# Patient Record
Sex: MALE | Race: WHITE | NOT HISPANIC OR LATINO | Employment: FULL TIME | URBAN - METROPOLITAN AREA
[De-identification: names, ages, dates, MRNs, and addresses within clinical notes are randomized per-mention and may not be internally consistent; named-entity substitution may affect disease eponyms.]

---

## 2024-03-25 ENCOUNTER — APPOINTMENT (OUTPATIENT)
Dept: RADIOLOGY | Facility: CLINIC | Age: 49
End: 2024-03-25
Payer: COMMERCIAL

## 2024-03-25 VITALS
HEART RATE: 80 BPM | WEIGHT: 315 LBS | BODY MASS INDEX: 45.1 KG/M2 | HEIGHT: 70 IN | DIASTOLIC BLOOD PRESSURE: 86 MMHG | SYSTOLIC BLOOD PRESSURE: 126 MMHG

## 2024-03-25 DIAGNOSIS — M25.522 PAIN IN LEFT ELBOW: ICD-10-CM

## 2024-03-25 DIAGNOSIS — M70.22 OLECRANON BURSITIS, LEFT ELBOW: Primary | ICD-10-CM

## 2024-03-25 PROCEDURE — 73080 X-RAY EXAM OF ELBOW: CPT

## 2024-03-25 PROCEDURE — 99203 OFFICE O/P NEW LOW 30 MIN: CPT | Performed by: ORTHOPAEDIC SURGERY

## 2024-03-25 RX ORDER — MELOXICAM 7.5 MG/1
TABLET ORAL
COMMUNITY
Start: 2024-01-17

## 2024-03-25 RX ORDER — IRBESARTAN AND HYDROCHLOROTHIAZIDE 150; 12.5 MG/1; MG/1
TABLET, FILM COATED ORAL
COMMUNITY
Start: 2024-03-16

## 2024-03-25 RX ORDER — PANTOPRAZOLE SODIUM 40 MG/1
TABLET, DELAYED RELEASE ORAL
COMMUNITY
Start: 2024-03-08

## 2024-03-25 NOTE — PROGRESS NOTES
Assessment/Plan:  1. Olecranon bursitis, left elbow  XR elbow 3+ vw left          Nayan has left-sided elbow pain consistent with olecranon bursitis.  I recommended ice and compression.  His x-rays show no evidence of acute abnormality.  This should resolve in the next few weeks with conservative measures.  If pain persists or worsens we could consider aspiration or other treatment options.    Subjective:   Nayan Lazaro is a 48 y.o. male who presents to the office for evaluation for left-sided elbow pain.  He denies any recent traumatic injury.  He has noticed swelling over the left elbow for the last 2 to 3 weeks.  He has painless swelling that he states can be warm slightly at times.  He denies any fevers or chills.  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      Review of Systems   Constitutional:  Negative for chills, fever and unexpected weight change.   HENT:  Negative for hearing loss, nosebleeds and sore throat.    Eyes:  Negative for pain, redness and visual disturbance.   Respiratory:  Negative for cough, shortness of breath and wheezing.    Cardiovascular:  Negative for chest pain, palpitations and leg swelling.   Gastrointestinal:  Negative for abdominal pain, nausea and vomiting.   Endocrine: Negative for polyphagia and polyuria.   Genitourinary:  Negative for dysuria and hematuria.   Musculoskeletal:         See HPI   Skin:  Negative for rash and wound.   Neurological:  Negative for dizziness, numbness and headaches.   Psychiatric/Behavioral:  Negative for decreased concentration and suicidal ideas. The patient is not nervous/anxious.          Past Medical History:   Diagnosis Date    Hypertension        Past Surgical History:   Procedure Laterality Date    WISDOM TOOTH EXTRACTION         Family History   Family history unknown: Yes       Social History     Occupational History    Not on file   Tobacco Use    Smoking status: Never    Smokeless tobacco: Never   Vaping  Use    Vaping status: Never Used   Substance and Sexual Activity    Alcohol use: Yes    Drug use: Never    Sexual activity: Not on file         Current Outpatient Medications:     irbesartan-hydrochlorothiazide (AVALIDE) 150-12.5 MG per tablet, , Disp: , Rfl:     meloxicam (MOBIC) 7.5 mg tablet, , Disp: , Rfl:     pantoprazole (PROTONIX) 40 mg tablet, , Disp: , Rfl:     Allergies   Allergen Reactions    Lisinopril Hives and Shortness Of Breath       Objective:  Vitals:    03/25/24 1502   BP: 126/86   Pulse: 80     Pain Score: 0-No pain      Left Elbow Exam     Tenderness   Left elbow tenderness location: Olecranon bursa swollen, no tenderness.     Range of Motion   Extension:  normal   Flexion:  normal   Pronation:  normal   Supination:  normal     Muscle Strength   Pronation:  5/5   Supination:  5/5     Tests   Varus: negative  Valgus: negative    Other   Erythema: absent  Sensation: normal  Pulse: present    Comments:  No tenderness palpation over olecranon at the triceps insertion            Physical Exam  Vitals reviewed.   Constitutional:       Appearance: He is well-developed.   HENT:      Head: Normocephalic and atraumatic.   Eyes:      Conjunctiva/sclera: Conjunctivae normal.      Pupils: Pupils are equal, round, and reactive to light.   Cardiovascular:      Rate and Rhythm: Normal rate.      Pulses: Normal pulses.   Pulmonary:      Effort: Pulmonary effort is normal. No respiratory distress.   Musculoskeletal:      Cervical back: Normal range of motion and neck supple.      Comments: As noted in HPI   Skin:     General: Skin is warm and dry.   Neurological:      General: No focal deficit present.      Mental Status: He is alert and oriented to person, place, and time.   Psychiatric:         Mood and Affect: Mood normal.         Behavior: Behavior normal.         I have personally reviewed pertinent films in PACS and my interpretation is as follows:  X-rays of the left elbow demonstrate soft tissue swelling  over the olecranon coul.  Chronic appearing and thesis at the insertion of the triceps along the  olecranon process.  No obvious fracture.  This area is nontender on exam.      This document was created using speech voice recognition software.   Grammatical errors, random word insertions, pronoun errors, and incomplete sentences are an occasional consequence of this system due to software limitations, ambient noise, and hardware issues.   Any formal questions or concerns about content, text, or information contained within the body of this dictation should be directly addressed to the provider for clarification.

## 2024-04-26 VITALS — WEIGHT: 315 LBS | HEIGHT: 70 IN | BODY MASS INDEX: 45.1 KG/M2

## 2024-04-26 DIAGNOSIS — M70.22 OLECRANON BURSITIS, LEFT ELBOW: Primary | ICD-10-CM

## 2024-04-26 PROCEDURE — 99213 OFFICE O/P EST LOW 20 MIN: CPT | Performed by: ORTHOPAEDIC SURGERY

## 2024-04-26 RX ORDER — VALACYCLOVIR HYDROCHLORIDE 500 MG/1
TABLET, FILM COATED ORAL
COMMUNITY
Start: 2024-04-18

## 2024-04-26 NOTE — PROGRESS NOTES
Assessment/Plan:  1. Olecranon bursitis, left elbow  Ambulatory referral to Orthopedic Surgery          Nayan has ongoing chronic olecranon bursitis that has bothered him for several months.  I informed him that it is generally recommended to avoid any needle in an olecranon bursa to help reduce the risk of infection.  At this point it has been present for several months and I do think aspiration is reasonable but I would like a elbow or hand surgeon to be the one to aspirate him in case there is any complication or need to do this in any other manner.  He was agreeable to this.  We have sent him to our hand surgeon for further discussion and treatment options for this.      Subjective:   Nayan Lazaro is a 48 y.o. male who presents to the office for follow-up for left elbow discomfort.  He has chronic olecranon bursitis of the left elbow.  He was last seen in the office 1 month ago and was recommended to undergo conservative treatment of ice, compression and anti-inflammatories.  He states conservative measures have not seemingly helped and he continues to have the swelling that is bothersome.  He is not very painful.  Denies any signs of infection such as warmth, erythema or any fevers or chills.  He denies any new injury.      Review of Systems   Constitutional:  Negative for chills, fever and unexpected weight change.   HENT:  Negative for hearing loss, nosebleeds and sore throat.    Eyes:  Negative for pain, redness and visual disturbance.   Respiratory:  Negative for cough, shortness of breath and wheezing.    Cardiovascular:  Negative for chest pain, palpitations and leg swelling.   Gastrointestinal:  Negative for abdominal pain, nausea and vomiting.   Endocrine: Negative for polyphagia and polyuria.   Genitourinary:  Negative for dysuria and hematuria.   Musculoskeletal:         See HPI   Skin:  Negative for rash and wound.   Neurological:  Negative for dizziness, numbness and headaches.    Psychiatric/Behavioral:  Negative for decreased concentration and suicidal ideas. The patient is not nervous/anxious.          Past Medical History:   Diagnosis Date    Hypertension        Past Surgical History:   Procedure Laterality Date    WISDOM TOOTH EXTRACTION         Family History   Family history unknown: Yes       Social History     Occupational History    Not on file   Tobacco Use    Smoking status: Never    Smokeless tobacco: Never   Vaping Use    Vaping status: Never Used   Substance and Sexual Activity    Alcohol use: Yes    Drug use: Never    Sexual activity: Not on file         Current Outpatient Medications:     valACYclovir (VALTREX) 500 mg tablet, , Disp: , Rfl:     irbesartan-hydrochlorothiazide (AVALIDE) 150-12.5 MG per tablet, , Disp: , Rfl:     meloxicam (MOBIC) 7.5 mg tablet, , Disp: , Rfl:     pantoprazole (PROTONIX) 40 mg tablet, , Disp: , Rfl:     Allergies   Allergen Reactions    Lisinopril Hives and Shortness Of Breath       Objective:  Vitals:            Left Elbow Exam     Tenderness   The patient is experiencing no tenderness.     Range of Motion   Extension:  normal   Flexion:  normal   Pronation:  normal   Supination:  normal     Muscle Strength   Pronation:  5/5   Supination:  5/5     Other   Erythema: absent  Sensation: normal  Pulse: present    Comments:  Swelling over the left and a bursa consistent with olecranon bursitis.  No sign of infection.            Physical Exam  Vitals reviewed.   Constitutional:       Appearance: He is well-developed.   HENT:      Head: Normocephalic and atraumatic.   Eyes:      Conjunctiva/sclera: Conjunctivae normal.      Pupils: Pupils are equal, round, and reactive to light.   Cardiovascular:      Rate and Rhythm: Normal rate.      Pulses: Normal pulses.   Pulmonary:      Effort: Pulmonary effort is normal. No respiratory distress.   Musculoskeletal:      Cervical back: Normal range of motion and neck supple.      Comments: As noted in HPI    Skin:     General: Skin is warm and dry.   Neurological:      General: No focal deficit present.      Mental Status: He is alert and oriented to person, place, and time.   Psychiatric:         Mood and Affect: Mood normal.         Behavior: Behavior normal.             This document was created using speech voice recognition software.   Grammatical errors, random word insertions, pronoun errors, and incomplete sentences are an occasional consequence of this system due to software limitations, ambient noise, and hardware issues.   Any formal questions or concerns about content, text, or information contained within the body of this dictation should be directly addressed to the provider for clarification.

## 2024-05-01 VITALS
BODY MASS INDEX: 45.1 KG/M2 | SYSTOLIC BLOOD PRESSURE: 129 MMHG | DIASTOLIC BLOOD PRESSURE: 86 MMHG | HEART RATE: 73 BPM | WEIGHT: 315 LBS | HEIGHT: 70 IN

## 2024-05-01 DIAGNOSIS — M70.22 OLECRANON BURSITIS, LEFT ELBOW: ICD-10-CM

## 2024-05-01 PROCEDURE — 99203 OFFICE O/P NEW LOW 30 MIN: CPT | Performed by: STUDENT IN AN ORGANIZED HEALTH CARE EDUCATION/TRAINING PROGRAM

## 2024-05-01 NOTE — PROGRESS NOTES
ASSESSMENT/PLAN:    Assessment:   Olecranon bursitis - left (10 weeks)    Plan:   -Discussed olecranon bursitis and discussed conservative management unless infection present.  Do not recommend aspiration at this time. Should improve over the next few months.   -Discussed reasons to return to clinic or to the emergency room    Follow Up:  PRN    ____________________________________________  CHIEF COMPLAINT:  Chief Complaint   Patient presents with   • Left Elbow - Pain         SUBJECTIVE:  Nayan Lazaro is a 48 y.o. male who presents as a referral from Dr. Whitley, my sports colleague, for left olecranon bursitis for the past 10 weeks.  He reports no history of trauma to the left elbow but noticed swelling at the tip of his left elbow.  The swelling over the past few weeks has not gotten any worse however it has not improved.  He denies any fevers or anything draining from the left elbow.  He reports painless elbow range of motion.  No history of gout.    PAST MEDICAL HISTORY:  Past Medical History:   Diagnosis Date   • Hypertension        PAST SURGICAL HISTORY:  Past Surgical History:   Procedure Laterality Date   • WISDOM TOOTH EXTRACTION         FAMILY HISTORY:  Family History   Family history unknown: Yes       SOCIAL HISTORY:  Social History     Tobacco Use   • Smoking status: Never   • Smokeless tobacco: Never   Vaping Use   • Vaping status: Never Used   Substance Use Topics   • Alcohol use: Yes   • Drug use: Never       MEDICATIONS:    Current Outpatient Medications:   •  irbesartan-hydrochlorothiazide (AVALIDE) 150-12.5 MG per tablet, , Disp: , Rfl:   •  meloxicam (MOBIC) 7.5 mg tablet, , Disp: , Rfl:   •  pantoprazole (PROTONIX) 40 mg tablet, , Disp: , Rfl:   •  valACYclovir (VALTREX) 500 mg tablet, , Disp: , Rfl:     ALLERGIES:  Allergies   Allergen Reactions   • Lisinopril Hives and Shortness Of Breath       REVIEW OF SYSTEMS:  Pertinent items are noted in HPI.  A comprehensive review of systems was  "negative.    LABS:  HgA1c: No results found for: \"HGBA1C\"  BMP: No results found for: \"GLUCOSE\", \"CALCIUM\", \"NA\", \"K\", \"CO2\", \"CL\", \"BUN\", \"CREATININE\"      _____________________________________________________  PHYSICAL EXAMINATION:  Vital signs: /86   Pulse 73   Ht 5' 10\" (1.778 m)   Wt (!) 145 kg (320 lb)   BMI 45.92 kg/m²   General: well developed and well nourished, alert, oriented times 3, and appears comfortable  Psychiatric: Normal  HEENT: Trachea Midline, No torticollis  Cardiovascular: No discernable arrhythmia  Pulmonary: No wheezing or stridor  Abdomen: No rebound or guarding  Extremities: No peripheral edema  Skin: No masses, erythema, lacerations, fluctation, ulcerations  Neurovascular: Sensation Intact to the Median, Ulnar, Radial Nerve, Motor Intact to the Median, Ulnar, Radial Nerve, and Pulses Intact    MUSCULOSKELETAL EXAMINATION:  Left elbow  Moderate swelling limited to the olecranon bursa with no signs of infection, cellulitis or drainage  Painless full elbow range of motion  Painless full pronation supination  Not tenderness to palpation over the olecranon    _____________________________________________________  STUDIES REVIEWED:  Images were reviewed in PACS by Dr. Bonds and demonstrate: Enthesophyte at the insertion of the triceps tendon with posterior elbow soft tissue swelling.       PROCEDURES PERFORMED:  Procedures  No Procedures performed today    "